# Patient Record
Sex: MALE | Race: WHITE | Employment: FULL TIME | URBAN - METROPOLITAN AREA
[De-identification: names, ages, dates, MRNs, and addresses within clinical notes are randomized per-mention and may not be internally consistent; named-entity substitution may affect disease eponyms.]

---

## 2024-04-30 ENCOUNTER — HOSPITAL ENCOUNTER (OUTPATIENT)
Age: 39
Discharge: HOME | End: 2024-04-30
Payer: COMMERCIAL

## 2024-04-30 ENCOUNTER — APPOINTMENT (OUTPATIENT)
Dept: CT IMAGING | Age: 39
End: 2024-04-30
Attending: NURSE PRACTITIONER
Payer: COMMERCIAL

## 2024-04-30 VITALS
TEMPERATURE: 98 F | OXYGEN SATURATION: 100 % | RESPIRATION RATE: 18 BRPM | HEART RATE: 60 BPM | DIASTOLIC BLOOD PRESSURE: 63 MMHG | SYSTOLIC BLOOD PRESSURE: 117 MMHG

## 2024-04-30 DIAGNOSIS — S10.93XA CONTUSION OF NECK, INITIAL ENCOUNTER: ICD-10-CM

## 2024-04-30 DIAGNOSIS — S19.9XXA INJURY OF ANTERIOR NECK, INITIAL ENCOUNTER: Primary | ICD-10-CM

## 2024-04-30 LAB
#MXD IC: 1 X10ˆ3/UL (ref 0.1–1)
BUN BLD-MCNC: 10 MG/DL (ref 7–18)
CHLORIDE BLD-SCNC: 102 MMOL/L (ref 98–112)
CO2 BLD-SCNC: 29 MMOL/L (ref 21–32)
CREAT BLD-MCNC: 1.1 MG/DL
EGFRCR SERPLBLD CKD-EPI 2021: 87 ML/MIN/1.73M2 (ref 60–?)
GLUCOSE BLD-MCNC: 87 MG/DL (ref 70–99)
HCT VFR BLD AUTO: 45.1 %
HCT VFR BLD CALC: 47 %
HGB BLD-MCNC: 14.5 G/DL
ISTAT IONIZED CALCIUM FOR CHEM 8: 1.22 MMOL/L (ref 1.12–1.32)
LYMPHOCYTES # BLD AUTO: 1 X10ˆ3/UL (ref 1–4)
LYMPHOCYTES NFR BLD AUTO: 13 %
MCH RBC QN AUTO: 28.6 PG (ref 26–34)
MCHC RBC AUTO-ENTMCNC: 32.2 G/DL (ref 31–37)
MCV RBC AUTO: 89 FL (ref 80–100)
MIXED CELL %: 13 %
NEUTROPHILS # BLD AUTO: 5.8 X10ˆ3/UL (ref 1.5–7.7)
NEUTROPHILS NFR BLD AUTO: 74 %
PLATELET # BLD AUTO: 211 X10ˆ3/UL (ref 150–450)
POTASSIUM BLD-SCNC: 4.4 MMOL/L (ref 3.6–5.1)
RBC # BLD AUTO: 5.07 X10ˆ6/UL
SODIUM BLD-SCNC: 141 MMOL/L (ref 136–145)
WBC # BLD AUTO: 7.8 X10ˆ3/UL (ref 4–11)

## 2024-04-30 PROCEDURE — 99204 OFFICE O/P NEW MOD 45 MIN: CPT | Performed by: NURSE PRACTITIONER

## 2024-04-30 PROCEDURE — 80047 BASIC METABLC PNL IONIZED CA: CPT | Performed by: NURSE PRACTITIONER

## 2024-04-30 PROCEDURE — 96374 THER/PROPH/DIAG INJ IV PUSH: CPT | Performed by: NURSE PRACTITIONER

## 2024-04-30 PROCEDURE — 70491 CT SOFT TISSUE NECK W/DYE: CPT | Performed by: NURSE PRACTITIONER

## 2024-04-30 PROCEDURE — 85025 COMPLETE CBC W/AUTO DIFF WBC: CPT | Performed by: NURSE PRACTITIONER

## 2024-04-30 RX ORDER — KETOROLAC TROMETHAMINE 30 MG/ML
15 INJECTION, SOLUTION INTRAMUSCULAR; INTRAVENOUS ONCE
Status: COMPLETED | OUTPATIENT
Start: 2024-04-30 | End: 2024-04-30

## 2024-04-30 NOTE — ED PROVIDER NOTES
Patient Seen in: [facility]      History     Chief Complaint   Patient presents with    Trauma     Stated Complaint: Neck Injury    Subjective:   HPI    38 yr old male here for evaluation of anterior neck pain, painful swallowing, difficulty talking and right ear pain after an injury during basketball last night around 9pm. He reports getting blocked, and elbowed by another player, right to his right side of his anterior neck. He reports feeling winded, not able to talk for a few minutes but denies LOC. He denies falling to the ground, hitting his head. He denies dizziness, headache, vision changes, neck or back pain currently. He states overnight clearing his throat a few times and having blood tinge in his sputum. He states his voice is a bit different and its hard to project when talking like usual. He denies shortness of breath, difficulty breathing, chest pain, nausea, vomiting since incident. Denies previous trauma or surgery to neck or airway in past.    Objective:   History reviewed. No pertinent past medical history.           History reviewed. No pertinent surgical history.             Social History     Socioeconomic History    Marital status:    Tobacco Use    Smoking status: Never    Smokeless tobacco: Never   Vaping Use    Vaping status: Never Used   Substance and Sexual Activity    Alcohol use: Yes     Comment: occ    Drug use: Never     Social Determinants of Health      Received from [facility]    Social Connections    Received from [facility]    Housing Stability              Review of Systems    Positive for stated complaint: Neck Injury  Other systems are as noted in HPI.  Constitutional and vital signs reviewed.      All other systems reviewed and negative except as noted above.    Physical Exam     ED Triage Vitals [04/30/24 0811]   /63   Pulse 60   Resp 18   Temp 97.8 °F (36.6 °C)   Temp src Temporal   SpO2 100 %   O2 Device  None (Room air)       Current:/63   Pulse 60   Temp 97.8 °F (36.6 °C) (Temporal)   Resp 18   SpO2 100%         Physical Exam  Vitals and nursing note reviewed.   Constitutional:       General: He is in acute distress.      Appearance: Normal appearance. He is not ill-appearing, toxic-appearing or diaphoretic.   HENT:      Head: Normocephalic. No raccoon eyes, Rosen's sign, right periorbital erythema or left periorbital erythema.      Jaw: There is normal jaw occlusion. No trismus, tenderness, swelling, pain on movement or malocclusion.      Right Ear: Tympanic membrane, ear canal and external ear normal. No drainage. There is no impacted cerumen. No mastoid tenderness. No hemotympanum. Tympanic membrane is not perforated, erythematous or bulging.      Left Ear: Tympanic membrane, ear canal and external ear normal. No drainage. There is no impacted cerumen. No mastoid tenderness. No hemotympanum. Tympanic membrane is not perforated, erythematous or bulging.      Nose: Nose normal. No congestion or rhinorrhea.      Mouth/Throat:      Lips: Pink.      Mouth: Mucous membranes are moist. No angioedema.      Tongue: No lesions. Tongue does not deviate from midline.      Palate: No mass and lesions.      Pharynx: Oropharynx is clear. Uvula midline. No pharyngeal swelling, oropharyngeal exudate, posterior oropharyngeal erythema or uvula swelling.      Tonsils: No tonsillar exudate or tonsillar abscesses.   Eyes:      General: Lids are normal.      Pupils: Pupils are equal, round, and reactive to light.   Neck:      Vascular: Normal carotid pulses. No carotid bruit or JVD.      Trachea: Tracheal tenderness (right side) and abnormal tracheal secretions (blood tinged when clearing throat overnight) present. No tracheal deviation.     Cardiovascular:      Rate and Rhythm: Normal rate.      Pulses: Normal pulses.   Pulmonary:      Effort: Pulmonary effort is normal. No respiratory distress.      Breath sounds: Normal  breath sounds. No stridor. No wheezing, rhonchi or rales.   Chest:      Chest wall: No tenderness.   Musculoskeletal:      Cervical back: Neck supple. Edema, signs of trauma (swelling to right side of trachea/anterior neck) and tenderness present. No erythema, rigidity, torticollis or crepitus. Pain with movement present. No spinous process tenderness or muscular tenderness. Normal range of motion.   Lymphadenopathy:      Cervical: No cervical adenopathy.   Skin:     General: Skin is warm.   Neurological:      Mental Status: He is alert and oriented to person, place, and time.   Psychiatric:         Mood and Affect: Mood normal.         Behavior: Behavior normal.               ED Course     Labs Reviewed   POCT ISTAT CHEM8 CARTRIDGE - Normal   POCT CBC          ED Course as of 04/30/24 1046  ------------------------------------------------------------  Time: 04/30 0854  Value: POCT CBC  Comment: (Reviewed)  ------------------------------------------------------------  Time: 04/30 0858  Value: POCT ISTAT chem8 cartridge  Comment: (Reviewed)  ------------------------------------------------------------  Time: 04/30 1018  Value: CT SOFT TISSUE OF NECK(CONTRAST ONLY) (CPT=70491)  Comment: Impression  CONCLUSION:     Unremarkable CT neck              MDM     38 yr old male here for eval after being elbowed in neck while playing basketball last night around 9pm.     ON exam, pt alert, resting without severe pain but seems uncomfortable.  Lungs clear with no stridor. NO JVD noted. No obvious tracheal deviation noted. There is swelling to right side of anterior neck near trachea. Carotid pulse present. Airway appears normal. Tongue moist with no deviation or swelling. Uvula midline. No erythema or swelling to pharynx. Hoarse voice noted. Left TM and canal normal. Nontender mastoid or preauricular area.   Right TM and canal normal. No perforation, erythema, drainage, bulging. Nontender mastoid process. Minimal tenderness to  just distal to ear. Nontender preauricular area. Full ROM to jaw with no increased pain with movement. NO trismus. Pupils perrla intact EOM. Nontender spinal processes, nontender chest wall.     Diff dx: vocal cord injury vs transected esophagus vs carotid dissection vs cricoid cartilage injury vs subcutaneous emphysema vs neck contusion vs neck hematoma    Discussed pt with Dr Villarreal, attending at [facility]. Will obtain labs, CT soft tissue neck to r/o above.  PT agrees with plan. He is stable at this time. His airway is intact and talking with no shortness of breath reported to me.    CBC unremarkable  Chem unremarkable  IV placed, Toradol 15mg IV given for pain.  NPO until further imaging results available.  PT aware.    TO [facility] for CT imaging, stable @0900    @1030= Discussed and reviewed CT imaging with DR Page at [facility]. There is no obvious hematoma or irregularity to airway  @1035= spoke with patient about results. Likely contused trachea/larynx from the impact.    Discussed soft and liquid diet, tylenol/ibuprofen, ice and heat, rest and avoiding arm workouts, excessive exercises, (pt asked about tennis-added to avoid for now) until feeling better.    PT agrees with plan.  All questions answered. Strict ER precautions given.    Counseled: Patient, regarding diagnosis, regarding treatment plan, regarding diagnostic results, regarding prescription, I have discussed with the patient the results of tests, differential diagnosis, and warning signs and symptoms that should prompt immediate return. The patient understands these instructions and agrees to the follow-up plan provided. There is no barriers to learning. Appropriate f/u given. Patient agrees to return for any concerns/ problems/complications.                                 Medical Decision Making      Disposition and Plan     Clinical Impression:  1. Injury of anterior neck, initial encounter    2. Contusion of neck, initial encounter          Disposition:  Discharge  4/30/2024 10:37 am    Follow-up:  Saundra Andrade, You  [address] [facility] [address]  [phone]    Schedule an appointment as soon as possible for a visit in 1 week            Medications Prescribed:  There are no discharge medications for this patient.

## 2024-04-30 NOTE — DISCHARGE INSTRUCTIONS
Follow up with your doctor next week for re-evaluation to make sure you are feeling better.    Stick with soft and liquid diet the next few days or until feeling better.     Take ibuprofen or tylenol every 6 hours for pain and swelling.   Use ICE 15 min on 2 hours off, or heat to help with pain and swelling.    Avoid excessive exercises or lifting with upper extremities arms and neck movement for a while until feeling better.    GO TO ED for worsening pain, fever, difficulty breathing or swallowing, dizziness, chest pain.

## 2024-04-30 NOTE — ED QUICK NOTES
Pt transferred by provider to [facility] for outpatient CT. Instructed to be NPO. Saline lock to L anticubital area wrapped with kerlix.

## 2024-04-30 NOTE — ED INITIAL ASSESSMENT (HPI)
States took an elbow to his throat since last night playing basketball.  States did cough up some phlegm with blood.  Adds c/o R ear pain.  No LOC.